# Patient Record
Sex: MALE | RURAL
[De-identification: names, ages, dates, MRNs, and addresses within clinical notes are randomized per-mention and may not be internally consistent; named-entity substitution may affect disease eponyms.]

---

## 2024-09-05 ENCOUNTER — TELEPHONE (OUTPATIENT)
Dept: OBSTETRICS AND GYNECOLOGY | Facility: CLINIC | Age: 29
End: 2024-09-05

## 2024-09-05 NOTE — TELEPHONE ENCOUNTER
----- Message from Radha Hicks sent at 9/4/2024 10:44 AM CDT -----  Who Called: Tatyana Zapien)    Caller is requesting assistance/information from provider's office.      Preferred Method of Contact: Phone Call  Patient's Preferred Phone Number on File: [phone]   Best Call Back Number, if different: call back # [phone]  Additional Information: calling to verify sample that they received is pt's -  partner names is ciro Flores

## 2025-02-25 ENCOUNTER — LAB VISIT (OUTPATIENT)
Dept: PRIMARY CARE CLINIC | Facility: CLINIC | Age: 30
End: 2025-02-25

## 2025-02-25 DIAGNOSIS — Z02.83 ENCOUNTER FOR DRUG SCREENING: Primary | ICD-10-CM

## 2025-02-25 PROCEDURE — 80305 DRUG TEST PRSMV DIR OPT OBS: CPT | Mod: ,,, | Performed by: NURSE PRACTITIONER

## 2025-02-25 PROCEDURE — 99000 SPECIMEN HANDLING OFFICE-LAB: CPT | Mod: ,,, | Performed by: NURSE PRACTITIONER

## 2025-02-25 NOTE — PROGRESS NOTES
Patient ID: Spencer Diaz is a 29 y.o. male.    Chief Complaint: No chief complaint on file.    History of Present Illness              Physical Exam              Assessment & Plan               1. Encounter for drug screening        No follow-ups on file.    This note was generated with the assistance of ambient listening technology. Verbal consent was obtained by the patient and accompanying visitor(s) for the recording of patient appointment to facilitate this note. I attest to having reviewed and edited the generated note for accuracy, though some syntax or spelling errors may persist. Please contact the author of this note for any clarification.

## 2025-03-03 ENCOUNTER — OFFICE VISIT (OUTPATIENT)
Dept: FAMILY MEDICINE | Facility: CLINIC | Age: 30
End: 2025-03-03

## 2025-03-03 DIAGNOSIS — Z02.4 ENCOUNTER FOR CDL (COMMERCIAL DRIVING LICENSE) EXAM: Primary | ICD-10-CM

## 2025-03-03 PROCEDURE — 99499 UNLISTED E&M SERVICE: CPT | Mod: ,,, | Performed by: NURSE PRACTITIONER

## 2025-03-03 NOTE — PROGRESS NOTES
KHRIS Adams   [facility]  [address] [address] [place] [address]     PATIENT NAME: Spencer Diaz  : 1995  DATE: 3/3/25  MRN: [id number]      Billing Provider: KHRIS Adams  Level of Service:   Patient PCP Information       None on File              Reason for Visit / Chief Complaint: D.O.T. 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    MEDICAL HISTORY:  He denies history of diabetes, hypertension, seizures, and sleep apnea. He has no prior surgical history.    MEDICATIONS:  He denies taking any medications on a regular basis.           Medical / Social / Family History   History reviewed. No pertinent past medical history.    Past Surgical History:   Procedure Laterality Date    WISDOM TOOTH EXTRACTION         Social History    reports that he has never smoked. He has never been exposed to tobacco smoke. He has never used smokeless tobacco. He reports that he does not drink alcohol and does not use drugs.    Family History  's family history includes Diabetes in his mother; No Known Problems in his father.    Medications and Allergies     Medications  No outpatient medications have been marked as taking for the 3/3/25 encounter (Office Visit) with Bushra Chavez FNP.       Allergies  Review of patient's allergies indicates:  No Known Allergies    Physical Examination     There were no vitals filed for this visit.     Physical Exam    Vitals: Blood pressure: 134/80.  General: No acute distress. Well-developed. Well-nourished.  Eyes: EOMI. Sclerae anicteric. Good peripheral vision 70 degrees.  HENT: Normocephalic. Atraumatic. Nares patent. Moist oral mucosa.  Ears: Bilateral TMs clear. Bilateral EACs clear. Able to hear whispered voice at 6 feet.  Cardiovascular: Regular rate. Regular rhythm. No murmurs. No rubs. No gallops. Normal S1,  S2.  Respiratory: Normal respiratory effort. Clear to auscultation bilaterally. No rales. No rhonchi. No wheezing.  Abdomen: Soft. Non-tender. Non-distended. Normoactive bowel sounds.  Musculoskeletal: No  obvious deformity.  Extremities: No lower extremity edema.  Neurological: Alert & oriented x3. No slurred speech. Normal gait.  Psychiatric: Normal mood. Normal affect. Good insight. Good judgment.  Skin: Warm. Dry. No rash.       Physical Exam     Assessment and Plan (including Health Maintenance)     :Assessment & Plan    IMPRESSION:  - Conducted DOT physical exam  - Assessed cardiovascular system: regular rhythm and rate without murmur  - Evaluated pulmonary function: clear lungs bilaterally on auscultation  - Tested peripheral vision: 70 degrees  - Checked hearing: patient able to hear whispered voice at 6 feet  - Reviewed blood pressure: 134/80, within acceptable range  - Analyzed urine specimen: all negative  - Determined patient eligible for 2-year DOT card    DOT CARD SUBMISSION:  - DOT card valid until March 3, 2027.  - Follow up in 10 days to submit DOT card results online or in person.  - If unable to submit online, patient to go to [facility] for in-person submission.  - Patient to personally deliver DOT card results as faxing not accepted.             Problem List Items Addressed This Visit    None  Visit Diagnoses         Encounter for CDL (commercial driving license) exam    -  Primary        .      Health Maintenance Due   Topic Date Due    Hepatitis C Screening  Never done    Lipid Panel  Never done    HIV Screening  Never done    TETANUS VACCINE  Never done    COVID-19 Vaccine (3 - 2024-25 season) 09/01/2024     Health Maintenance Topics with due status: Not Due       Topic Last Completion Date    RSV Vaccine (Age 60+ and Pregnant patients) Not Due       Procedures     No future appointments.     Follow up in about 2 years (around 3/3/2027), or for DOT exam.     Signature:  KHRIS Adams    Date of  encounter: 3/3/25      This note was generated with the assistance of ambient listening technology. Verbal consent was obtained by the patient and accompanying visitor(s) for the recording of patient appointment to facilitate this note. I attest to having reviewed and edited the generated note for accuracy, though some syntax or spelling errors may persist. Please contact the author of this note for any clarification.